# Patient Record
Sex: MALE | Race: WHITE | NOT HISPANIC OR LATINO
[De-identification: names, ages, dates, MRNs, and addresses within clinical notes are randomized per-mention and may not be internally consistent; named-entity substitution may affect disease eponyms.]

---

## 2021-05-26 ENCOUNTER — NON-APPOINTMENT (OUTPATIENT)
Age: 66
End: 2021-05-26

## 2021-06-01 PROBLEM — Z00.00 ENCOUNTER FOR PREVENTIVE HEALTH EXAMINATION: Status: ACTIVE | Noted: 2021-06-01

## 2021-06-18 ENCOUNTER — APPOINTMENT (OUTPATIENT)
Dept: UROLOGY | Facility: CLINIC | Age: 66
End: 2021-06-18
Payer: MEDICARE

## 2021-06-18 ENCOUNTER — TRANSCRIPTION ENCOUNTER (OUTPATIENT)
Age: 66
End: 2021-06-18

## 2021-06-18 VITALS
DIASTOLIC BLOOD PRESSURE: 81 MMHG | BODY MASS INDEX: 30.36 KG/M2 | SYSTOLIC BLOOD PRESSURE: 132 MMHG | HEART RATE: 83 BPM | TEMPERATURE: 98 F | WEIGHT: 205 LBS | HEIGHT: 69 IN

## 2021-06-18 DIAGNOSIS — C61 MALIGNANT NEOPLASM OF PROSTATE: ICD-10-CM

## 2021-06-18 PROCEDURE — J3490T: CUSTOM

## 2021-06-18 PROCEDURE — 54235 NJX CORPORA CAVERNOSA RX AGT: CPT

## 2021-06-18 PROCEDURE — 93980 PENILE VASCULAR STUDY: CPT

## 2021-06-18 PROCEDURE — 99205 OFFICE O/P NEW HI 60 MIN: CPT | Mod: 25,57

## 2021-06-18 RX ORDER — SULFAMETHOXAZOLE AND TRIMETHOPRIM 800; 160 MG/1; MG/1
800-160 TABLET ORAL TWICE DAILY
Qty: 18 | Refills: 0 | Status: ACTIVE | COMMUNITY
Start: 2021-06-18 | End: 1900-01-01

## 2021-06-18 NOTE — HISTORY OF PRESENT ILLNESS
[Erectile Dysfunction] : Erectile Dysfunction [FreeTextEntry1] : This is a 65 year old male patient with DM2, depression, RLS, \par Renetta 9, Prostate cancer s/p radical prostatectomy and pelvic lymph node dissection on May 9, 2019, one positive lymp node. s/p radiation completed 2019, s/p Eligard hormone therapy 2020, Dexa bone scan 2020 is negative. \par  PSA undetectable within the past 2 months. Dr Alberta Barajas\par Patient presents to clinic with complaints of erectile dysfunction 1/10 after radical prostatectomy. \par +desire, libido\par Has tried PDE5 - failed, penile pump vacuum device. \par Denies dysuria, gross hemturia, denies other urinary issues.\par \par Social Hx: 35 pack year smoker, quit 1999, denies etoh\par Fhx: Sister - tongue ca. Father- heart disease, mother - diabetes\par \par  [Urinary Incontinence] : no urinary incontinence [Urinary Retention] : no urinary retention [Urinary Urgency] : no urinary urgency [Urinary Frequency] : no urinary frequency [Nocturia] : no nocturia [Straining] : no straining [Weak Stream] : no weak stream [Intermittency] : no intermittency [Dysuria] : no dysuria [Hematuria - Gross] : no gross hematuria [Hematuria - Microscopic] : no microscopic hematuria [Bladder Spasm] : no bladder spasm [Abdominal Pain] : no abdominal pain [Flank Pain] : no flank pain [Weight Loss] : no recent ~M [unfilled] weight loss [Fever] : no fever [Fatigue] : no fatigue [Nausea] : no nausea [Anorexia] : no anorexia [Incisional Drainage] : no incisional drainage [Incisional Pain] : no incisional pain

## 2021-06-18 NOTE — ASSESSMENT
[FreeTextEntry1] : Erectile Dysfunction s/p radical prostatectomy, radiation and hormone therapy\par Failed PDE5\par Discussed other treatment options, ICI or IPP\par highly interested in treatment options. \par Penile Doppler US today performed today\par coproral venoocclusive dysufnciotn noted\par We had a long conversation regarding the prognosis in patients with corporal veno-occlusive erectile dysfunction. He understands that this represents end stage ED and as such is highly unlikely to respond to pharmacological therapy. Treatment options include maximum dose intracavernous injections, vacuum erection device and penile implant.\par We had a long discussion regarding the risks and benefits of inflatable penile prosthesis.  Risks of the surgery including a 1% implant infection rate which would require explantation were discussed at length. He also understands that rarely with these infections there can be associated penile tissue loss with an extremely rare risk of partial penile amputation. In addition, we spoke about the loss of phallic length associated with inflatable penile prosthesis.  I demonstrated his stretched flaccid penile length to him today and he understands that this may be used as a surrogate for his postoperative phallic length.  \par \par In addition, we spoke about the other aesthetic changes within the penis including curvatures which he had not seen already.  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 We spoke about device erosion through the urethral meatus/glans over the long term as a potential risk.  Mechanical malfunction of the device was discussed as well and he was quoted a 40% 15-year implant survival rate.  He understands that if the device were to malfunction he will require a surgical revision.  In addition, he understands that he will no longer be able to achieve spontaneous erections once the implant is placed. \par \par Other intraoperative risks, including injury to the bowel and bladder as well as to pelvic vasculature were discussed at length. \par \par In addition, I had him watch a video on the penile prosthesis today here in the office.  He was given literature to review at home as well as an instruction sheet which I give to all of my patients clearly outlining all the risks and benefits of this operation discussed today.  He received all these and understands that he needs to review all of them in the preoperative period.\par \par He opts to proceed at this time.  We will send off some laboratory studies on him and we will schedule him appropriately once medical clearance is obtained.\par \par Prostate Cancer s/p radical prostatectomy \par care per [facility] \par

## 2021-06-18 NOTE — PHYSICAL EXAM
[General Appearance - Well Developed] : well developed [General Appearance - Well Nourished] : well nourished [Normal Appearance] : normal appearance [Well Groomed] : well groomed [General Appearance - In No Acute Distress] : no acute distress [Edema] : no peripheral edema [Respiration, Rhythm And Depth] : normal respiratory rhythm and effort [Exaggerated Use Of Accessory Muscles For Inspiration] : no accessory muscle use [Abdomen Soft] : soft [Abdomen Tenderness] : non-tender [Urethral Meatus] : meatus normal [Costovertebral Angle Tenderness] : no ~M costovertebral angle tenderness [Urinary Bladder Findings] : the bladder was normal on palpation [Scrotum] : the scrotum was normal [Testes Mass (___cm)] : there were no testicular masses [Normal Station and Gait] : the gait and station were normal for the patient's age [] : no rash [No Focal Deficits] : no focal deficits [Oriented To Time, Place, And Person] : oriented to person, place, and time [Affect] : the affect was normal [Mood] : the mood was normal [Not Anxious] : not anxious [No Palpable Adenopathy] : no palpable adenopathy

## 2021-06-21 LAB
ANION GAP SERPL CALC-SCNC: 10 MMOL/L
APPEARANCE: CLEAR
APTT BLD: 35.5 SEC
BACTERIA UR CULT: NORMAL
BACTERIA: NEGATIVE
BASOPHILS # BLD AUTO: 0.04 K/UL
BASOPHILS NFR BLD AUTO: 0.5 %
BILIRUBIN URINE: NEGATIVE
BLOOD URINE: NEGATIVE
BUN SERPL-MCNC: 17 MG/DL
CALCIUM SERPL-MCNC: 9.6 MG/DL
CHLORIDE SERPL-SCNC: 107 MMOL/L
CO2 SERPL-SCNC: 24 MMOL/L
COLOR: YELLOW
CREAT SERPL-MCNC: 0.99 MG/DL
EOSINOPHIL # BLD AUTO: 0.54 K/UL
EOSINOPHIL NFR BLD AUTO: 7 %
ESTIMATED AVERAGE GLUCOSE: 143 MG/DL
GLUCOSE QUALITATIVE U: NEGATIVE
GLUCOSE SERPL-MCNC: 112 MG/DL
HBA1C MFR BLD HPLC: 6.6 %
HCT VFR BLD CALC: 42.3 %
HGB BLD-MCNC: 12.6 G/DL
HYALINE CASTS: 2 /LPF
IMM GRANULOCYTES NFR BLD AUTO: 0.6 %
INR PPP: 0.92 RATIO
KETONES URINE: NEGATIVE
LEUKOCYTE ESTERASE URINE: NEGATIVE
LYMPHOCYTES # BLD AUTO: 1.41 K/UL
LYMPHOCYTES NFR BLD AUTO: 18.3 %
MAN DIFF?: NORMAL
MCHC RBC-ENTMCNC: 29 PG
MCHC RBC-ENTMCNC: 29.8 GM/DL
MCV RBC AUTO: 97.5 FL
MICROSCOPIC-UA: NORMAL
MONOCYTES # BLD AUTO: 0.47 K/UL
MONOCYTES NFR BLD AUTO: 6.1 %
NEUTROPHILS # BLD AUTO: 5.2 K/UL
NEUTROPHILS NFR BLD AUTO: 67.5 %
NITRITE URINE: NEGATIVE
PH URINE: 6
PLATELET # BLD AUTO: 320 K/UL
POTASSIUM SERPL-SCNC: 4.5 MMOL/L
PROTEIN URINE: NORMAL
PSA SERPL-MCNC: 0.06 NG/ML
PT BLD: 10.9 SEC
RBC # BLD: 4.34 M/UL
RBC # FLD: 14.2 %
RED BLOOD CELLS URINE: 1 /HPF
SODIUM SERPL-SCNC: 141 MMOL/L
SPECIFIC GRAVITY URINE: 1.03
SQUAMOUS EPITHELIAL CELLS: 0 /HPF
UROBILINOGEN URINE: NORMAL
WBC # FLD AUTO: 7.71 K/UL
WHITE BLOOD CELLS URINE: 2 /HPF

## 2021-06-28 ENCOUNTER — NON-APPOINTMENT (OUTPATIENT)
Age: 66
End: 2021-06-28

## 2021-07-09 ENCOUNTER — NON-APPOINTMENT (OUTPATIENT)
Age: 66
End: 2021-07-09

## 2021-07-11 ENCOUNTER — TRANSCRIPTION ENCOUNTER (OUTPATIENT)
Age: 66
End: 2021-07-11

## 2021-07-12 ENCOUNTER — OUTPATIENT (OUTPATIENT)
Dept: OUTPATIENT SERVICES | Facility: HOSPITAL | Age: 66
LOS: 1 days | Discharge: HOME | End: 2021-07-12
Payer: MEDICARE

## 2021-07-12 ENCOUNTER — APPOINTMENT (OUTPATIENT)
Dept: UROLOGY | Facility: AMBULATORY SURGERY CENTER | Age: 66
End: 2021-07-12

## 2021-07-12 LAB — GLUCOSE BLDC GLUCOMTR-MCNC: 137 MG/DL — HIGH (ref 70–99)

## 2021-07-12 PROCEDURE — A4218: CPT | Mod: NC

## 2021-07-12 PROCEDURE — 54235 NJX CORPORA CAVERNOSA RX AGT: CPT

## 2021-07-12 PROCEDURE — 54405 INSERT MULTI-COMP PENIS PROS: CPT

## 2021-07-14 ENCOUNTER — APPOINTMENT (OUTPATIENT)
Dept: UROLOGY | Facility: CLINIC | Age: 66
End: 2021-07-14
Payer: MEDICARE

## 2021-07-14 VITALS — SYSTOLIC BLOOD PRESSURE: 113 MMHG | DIASTOLIC BLOOD PRESSURE: 70 MMHG | TEMPERATURE: 97.6 F | HEART RATE: 80 BPM

## 2021-07-14 PROCEDURE — 99024 POSTOP FOLLOW-UP VISIT: CPT

## 2021-07-14 NOTE — PHYSICAL EXAM
[General Appearance - Well Developed] : well developed [General Appearance - Well Nourished] : well nourished [Normal Appearance] : normal appearance [Well Groomed] : well groomed [General Appearance - In No Acute Distress] : no acute distress [Abdomen Soft] : soft [Abdomen Tenderness] : non-tender [Costovertebral Angle Tenderness] : no ~M costovertebral angle tenderness [Urethral Meatus] : meatus normal [Penis Abnormality] : normal circumcised penis [Edema] : no peripheral edema [] : no respiratory distress [Respiration, Rhythm And Depth] : normal respiratory rhythm and effort [Exaggerated Use Of Accessory Muscles For Inspiration] : no accessory muscle use [Oriented To Time, Place, And Person] : oriented to person, place, and time [Affect] : the affect was normal [Mood] : the mood was normal [Not Anxious] : not anxious [Normal Station and Gait] : the gait and station were normal for the patient's age [No Focal Deficits] : no focal deficits [No Palpable Adenopathy] : no palpable adenopathy [FreeTextEntry1] : penoscrotal edema with ecchymosis, dermabond on glans and suprapubic c/d/i, bulb drain patent

## 2021-07-14 NOTE — ASSESSMENT
[FreeTextEntry1] : Erectile Dysfunction s/p radical prostatectomy\par Insertion of IPP 7/12/2021.\par POD #3 \par Penoscrotal edema and ecchymosis.\par Dermabond on suprapubic and glans penis c/d/i\par SARIKA bulb drain with serosanguinous fluid,  50 ml output. Total output less than 300 mi x2 days. \par No signs of infection noted. no erythema, no drainage from surgical incisions. \par \par Bulb drain with sutures removed without complications.\par No bleeding, gauze dressing with Tegaderm.\par Wound care instructions provided: keep it clean and dry. Instructed patient correct dressing change. \par patient verbalized and demonstrated understanding of instructions. \par \par Reinforced post surgery instructions to patient with wife. OTC stool softeners for constipation.  \par Keep incisions clean and dry. continue to wear scrotal/athletic support over the first five post operative days. It is important to keep penis pointing towards umbilicus (belly button) to help reduce swelling. It is normal to have bruises and discoloration of the penis and scrotum in the first 10-14 days after surgery. Avoid tub bathing for 2 full weeks. No strenuous exercise or heavy lifting (nothing greater than 20 lbs) for six weeks. \par Call office if fever and chills over 101 degrees. Excessive drainage from your wound, especially blood or pus, or redness around wound. pain or scrotal swelling that is not improving after first week following surgery. \par \par Follow up in two weeks for IPP teaching/ training.

## 2021-07-14 NOTE — HISTORY OF PRESENT ILLNESS
[Currently Experiencing ___] :  [unfilled] [Erectile Dysfunction] : Erectile Dysfunction [None] : None [FreeTextEntry1] : This is a 65 year old male with Hx of DM2, depression\par Renetta 9, Prostate cancer,  erectile dysfunction s/p radical prostatectomy,\par S/P insertion of IPP 7/12/2021.\par Here for POD #3 drain removal. \par Patient reports tolerable discomfort, take OTC Tylenol.\par complaints of constipation for 2 days\par Bulb draining less this morning, emptied 50 cc light red drainage. \par Denies fever and chills.\par Denies urinary problems.  [Urinary Incontinence] : no urinary incontinence [Urinary Retention] : no urinary retention [Urinary Urgency] : no urinary urgency [Urinary Frequency] : no urinary frequency [Nocturia] : no nocturia [Straining] : no straining [Weak Stream] : no weak stream [Intermittency] : no intermittency [Post-Void Dribbling] : no post-void dribbling [Dysuria] : no dysuria [Hematuria - Gross] : no gross hematuria [Hematuria - Microscopic] : no microscopic hematuria [Bladder Spasm] : no bladder spasm [Abdominal Pain] : no abdominal pain [Flank Pain] : no flank pain [Fever] : no fever [Fatigue] : no fatigue [Nausea] : no nausea [Anorexia] : no anorexia [Incisional Drainage] : no incisional drainage [Incisional Pain] : no incisional pain

## 2021-07-21 ENCOUNTER — TRANSCRIPTION ENCOUNTER (OUTPATIENT)
Age: 66
End: 2021-07-21

## 2021-07-22 ENCOUNTER — TRANSCRIPTION ENCOUNTER (OUTPATIENT)
Age: 66
End: 2021-07-22

## 2021-07-27 ENCOUNTER — APPOINTMENT (OUTPATIENT)
Dept: UROLOGY | Facility: CLINIC | Age: 66
End: 2021-07-27
Payer: MEDICARE

## 2021-07-27 VITALS — TEMPERATURE: 95.3 F | HEART RATE: 88 BPM | DIASTOLIC BLOOD PRESSURE: 78 MMHG | SYSTOLIC BLOOD PRESSURE: 138 MMHG

## 2021-07-27 PROCEDURE — 99213 OFFICE O/P EST LOW 20 MIN: CPT | Mod: 24

## 2021-07-27 NOTE — PHYSICAL EXAM
[General Appearance - Well Developed] : well developed [General Appearance - Well Nourished] : well nourished [Normal Appearance] : normal appearance [Well Groomed] : well groomed [General Appearance - In No Acute Distress] : no acute distress [Abdomen Soft] : soft [Abdomen Tenderness] : non-tender [Costovertebral Angle Tenderness] : no ~M costovertebral angle tenderness [Urethral Meatus] : meatus normal [Urinary Bladder Findings] : the bladder was normal on palpation [Scrotum] : the scrotum was normal [Edema] : no peripheral edema [] : no respiratory distress [Respiration, Rhythm And Depth] : normal respiratory rhythm and effort [Exaggerated Use Of Accessory Muscles For Inspiration] : no accessory muscle use [Oriented To Time, Place, And Person] : oriented to person, place, and time [Affect] : the affect was normal [Mood] : the mood was normal [Not Anxious] : not anxious [Normal Station and Gait] : the gait and station were normal for the patient's age [No Focal Deficits] : no focal deficits [No Palpable Adenopathy] : no palpable adenopathy [FreeTextEntry1] : good cosmesis, mild peno scrotal edema. pump moveable.

## 2021-07-27 NOTE — HISTORY OF PRESENT ILLNESS
[Erectile Dysfunction] : Erectile Dysfunction [None] : None [FreeTextEntry1] : 65M with Erectile dysfunction s/p Radical prostatectomy  S/P insertion of IPP 7/12/2021.\par here for 2 week post op follow up \par Tolerable discomfort, OTC tylenol \par Denies fever and chills, dneies voiding problems

## 2021-07-27 NOTE — ASSESSMENT
[FreeTextEntry1] : 65M with Erectile dysfunction s/p Radical prostatectomy  S/P insertion of IPP 7/12/2021.\par \par Dermabond on suprapubic and glans penis c/d/i\par No signs of infection noted. no erythema, no drainage from surgical incisions. \par \par Reinforced posy surgery instructions. Keep incisions clean and dry. continue to wear scrotal/athletic support over the first five post operative days. It is important to keep penis pointing towards umbilicus (belly button) to help reduce swelling. It is normal to have bruises and discoloration of the penis and scrotum in the first 10-14 days after surgery. Avoid tub bathing for 2 full weeks. No strenuous exercise or heavy lifting (nothing greater than 20 lbs) for six weeks. \par Call office if fever and chills over 101 degrees. Excessive drainage from your wound, especially blood or pus, or redness around wound. pain or scrotal swelling that is not improving after first week following surgery. \par \par Follow up in two weeks for IPP teaching/ training.

## 2021-08-13 ENCOUNTER — TRANSCRIPTION ENCOUNTER (OUTPATIENT)
Age: 66
End: 2021-08-13

## 2021-09-01 ENCOUNTER — APPOINTMENT (OUTPATIENT)
Dept: UROLOGY | Facility: CLINIC | Age: 66
End: 2021-09-01
Payer: MEDICARE

## 2021-09-01 VITALS — TEMPERATURE: 97.2 F | DIASTOLIC BLOOD PRESSURE: 76 MMHG | HEART RATE: 85 BPM | SYSTOLIC BLOOD PRESSURE: 128 MMHG

## 2021-09-01 PROCEDURE — 99024 POSTOP FOLLOW-UP VISIT: CPT

## 2021-09-01 NOTE — ASSESSMENT
[FreeTextEntry1] : 65 year old male with Erectile dysfunction s/p Radical prostatectomy S/P insertion of IPP 7/12/2021.\par \par no edema, ecchymosis\par incisions healed good\par No signs of infection noted. no erythema, no drainage from surgical incisions. \par He is able to identify parts of IPP, able to inflate and deflate.\par \par Reinforced IPP use instructions.\par Advised to stretch scrotum and move pump daily when taking warm showers. \par Use pump often and practice inflation and deflation. \par verbalized understanding of instructions. \par \par 3 month follow up end of  September. \par \par Patient lives in [facility] and will mojgan for appointment date.

## 2021-09-01 NOTE — HISTORY OF PRESENT ILLNESS
[Erectile Dysfunction] : Erectile Dysfunction [Currently Experiencing ___] :  [unfilled] [None] : None [FreeTextEntry1] : 65M with Erectile dysfunction s/p Radical prostatectomy S/P insertion of IPP 7/12/2021.\par Presents for follow up IPP teaching.\par Complaints of not being able to fully deflate pump\par Anxious, "scared" per wife at bedside. \par Denies pain, swelling, fever or chills\par Denies voiding problems

## 2021-09-01 NOTE — PHYSICAL EXAM
[Urethral Meatus] : meatus normal [Penis Abnormality] : normal circumcised penis [FreeTextEntry1] : moveable pump, good cosmesis

## 2021-10-14 ENCOUNTER — APPOINTMENT (OUTPATIENT)
Dept: UROLOGY | Facility: CLINIC | Age: 66
End: 2021-10-14
Payer: MEDICARE

## 2021-10-14 ENCOUNTER — APPOINTMENT (OUTPATIENT)
Dept: UROLOGY | Facility: CLINIC | Age: 66
End: 2021-10-14

## 2021-10-14 VITALS
DIASTOLIC BLOOD PRESSURE: 80 MMHG | BODY MASS INDEX: 30.36 KG/M2 | HEART RATE: 88 BPM | SYSTOLIC BLOOD PRESSURE: 132 MMHG | TEMPERATURE: 98 F | HEIGHT: 69 IN | WEIGHT: 205 LBS

## 2021-10-14 DIAGNOSIS — N52.31 ERECTILE DYSFUNCTION FOLLOWING RADICAL PROSTATECTOMY: ICD-10-CM

## 2021-10-14 PROCEDURE — 99213 OFFICE O/P EST LOW 20 MIN: CPT

## 2021-10-14 NOTE — PHYSICAL EXAM
[General Appearance - Well Developed] : well developed [General Appearance - Well Nourished] : well nourished [Normal Appearance] : normal appearance [Well Groomed] : well groomed [General Appearance - In No Acute Distress] : no acute distress [Abdomen Soft] : soft [Abdomen Tenderness] : non-tender [Costovertebral Angle Tenderness] : no ~M costovertebral angle tenderness [Urethral Meatus] : meatus normal [Urinary Bladder Findings] : the bladder was normal on palpation [Scrotum] : the scrotum was normal [Testes Mass (___cm)] : there were no testicular masses [Edema] : no peripheral edema [] : no respiratory distress [Respiration, Rhythm And Depth] : normal respiratory rhythm and effort [Exaggerated Use Of Accessory Muscles For Inspiration] : no accessory muscle use [Oriented To Time, Place, And Person] : oriented to person, place, and time [Affect] : the affect was normal [Mood] : the mood was normal [Not Anxious] : not anxious [Normal Station and Gait] : the gait and station were normal for the patient's age [No Focal Deficits] : no focal deficits [No Palpable Adenopathy] : no palpable adenopathy [FreeTextEntry1] : good cosmesis, soft moveable pump.

## 2021-10-14 NOTE — ASSESSMENT
[FreeTextEntry1] : 65M with Erectile dysfunction s/p Radical prostatectomy S/P insertion of IPP 7/12/2021.\par 3 month follow up \par good cosmesis\par reinforced IPP use\par advised to deflate more by pressing deflate button and pressing penile shaft not he head.\par He is able to inflate and deflate.\par very happy \par \par follow up prn

## 2021-10-14 NOTE — END OF VISIT
[FreeTextEntry3] : I have seen and evaluated the patient and formulated a plan of care after the NP/resident or fellow saw the patient. I have edited the note above to reflect my recommendations and agree with the documentation and plan as set forth.\par  [Time Spent: ___ minutes] : I have spent [unfilled] minutes of time on the encounter. [>50% of the face to face encounter time was spent on counseling and/or coordination of care for ___] : Greater than 50% of the face to face encounter time was spent on counseling and/or coordination of care for [unfilled]

## 2021-10-14 NOTE — HISTORY OF PRESENT ILLNESS
[Currently Experiencing ___] :  [unfilled] [Erectile Dysfunction] : Erectile Dysfunction [None] : None [FreeTextEntry1] : 65M with Erectile dysfunction s/p Radical prostatectomy S/P insertion of IPP 7/12/2021.\par returned for 3 month post op IPP teaching\par concern of having "eternally erect".\par no pain\par he feels happy\par Denies urinary problems

## 2021-10-15 ENCOUNTER — APPOINTMENT (OUTPATIENT)
Dept: UROLOGY | Facility: CLINIC | Age: 66
End: 2021-10-15

## 2022-09-12 ENCOUNTER — NON-APPOINTMENT (OUTPATIENT)
Age: 67
End: 2022-09-12

## 2022-09-22 ENCOUNTER — APPOINTMENT (OUTPATIENT)
Dept: UROLOGY | Facility: CLINIC | Age: 67
End: 2022-09-22

## 2022-09-22 VITALS
BODY MASS INDEX: 28.14 KG/M2 | DIASTOLIC BLOOD PRESSURE: 82 MMHG | SYSTOLIC BLOOD PRESSURE: 132 MMHG | HEIGHT: 69 IN | HEART RATE: 77 BPM | TEMPERATURE: 98.2 F | WEIGHT: 190 LBS

## 2022-09-22 PROCEDURE — 99212 OFFICE O/P EST SF 10 MIN: CPT

## 2022-09-22 NOTE — ASSESSMENT
[FreeTextEntry1] : ED s/p IPP\par deflate 2-3 daily\par reassured\par very happy\par f/u PRN\par prstate ca care per [facility]

## 2022-09-22 NOTE — HISTORY OF PRESENT ILLNESS
[FreeTextEntry1] : ED s/p IPP\par very happy with implant\par comes in for eval\par prostate cancer management as per [facility]\par

## 2022-09-22 NOTE — PHYSICAL EXAM
[Urethral Meatus] : meatus normal [Penis Abnormality] : normal circumcised penis [Urinary Bladder Findings] : the bladder was normal on palpation [Scrotum] : the scrotum was normal [FreeTextEntry1] : pump soft mobile. excellent cosemesis